# Patient Record
Sex: FEMALE | Employment: UNEMPLOYED | URBAN - METROPOLITAN AREA
[De-identification: names, ages, dates, MRNs, and addresses within clinical notes are randomized per-mention and may not be internally consistent; named-entity substitution may affect disease eponyms.]

---
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
## 2018-02-28 LAB
SURGICAL PATHOLOGY REPORT: NORMAL
SURGICAL PATHOLOGY REPORT: NORMAL

## 2020-12-05 ENCOUNTER — HOSPITAL ENCOUNTER (EMERGENCY)
Age: 20
Discharge: HOME | End: 2020-12-05
Attending: EMERGENCY MEDICINE
Payer: COMMERCIAL

## 2020-12-05 VITALS
OXYGEN SATURATION: 96 % | HEART RATE: 96 BPM | SYSTOLIC BLOOD PRESSURE: 103 MMHG | DIASTOLIC BLOOD PRESSURE: 74 MMHG | RESPIRATION RATE: 18 BRPM | TEMPERATURE: 97.5 F

## 2020-12-05 PROCEDURE — 99284 EMERGENCY DEPT VISIT MOD MDM: CPT

## 2020-12-05 PROCEDURE — 2580000003 HC RX 258: Performed by: EMERGENCY MEDICINE

## 2020-12-05 PROCEDURE — 6360000002 HC RX W HCPCS: Performed by: EMERGENCY MEDICINE

## 2020-12-05 PROCEDURE — 96374 THER/PROPH/DIAG INJ IV PUSH: CPT

## 2020-12-05 RX ORDER — ONDANSETRON 2 MG/ML
4 INJECTION INTRAMUSCULAR; INTRAVENOUS EVERY 6 HOURS PRN
Status: DISCONTINUED | OUTPATIENT
Start: 2020-12-05 | End: 2020-12-05 | Stop reason: HOSPADM

## 2020-12-05 RX ORDER — SODIUM CHLORIDE, SODIUM LACTATE, POTASSIUM CHLORIDE, CALCIUM CHLORIDE 600; 310; 30; 20 MG/100ML; MG/100ML; MG/100ML; MG/100ML
1000 INJECTION, SOLUTION INTRAVENOUS ONCE
Status: COMPLETED | OUTPATIENT
Start: 2020-12-05 | End: 2020-12-05

## 2020-12-05 RX ADMIN — SODIUM CHLORIDE, POTASSIUM CHLORIDE, SODIUM LACTATE AND CALCIUM CHLORIDE 1000 ML: 600; 310; 30; 20 INJECTION, SOLUTION INTRAVENOUS at 00:22

## 2020-12-05 RX ADMIN — ONDANSETRON 4 MG: 2 INJECTION INTRAMUSCULAR; INTRAVENOUS at 00:18

## 2020-12-05 ASSESSMENT — ENCOUNTER SYMPTOMS
DIARRHEA: 0
SHORTNESS OF BREATH: 0
ABDOMINAL PAIN: 0
WHEEZING: 0
EYE PAIN: 0
VOMITING: 1
NAUSEA: 1
COUGH: 0

## 2020-12-05 NOTE — ED NOTES
Pt resting in bed. Pt states that she does not feel nauseas and does not have any needs at this time.       Jairo Aldana RN  12/05/20 3232

## 2020-12-05 NOTE — ED PROVIDER NOTES
[address] ENCOUNTER          ATTENDING PHYSICIAN NOTE       Date of evaluation: 12/5/2020    Chief Complaint     Alcohol Intoxication (pt states she has been drinking since 6pm )      History of Present Illness     Lenore Cristina is a 23 y.o. female who presents with a chief complaint of alcohol intoxication. She has brought here by her friends. She was having a girls night and they have been drinking since approximately 6 PM.  No reported drug use. She states to me she believes she drank too much but cannot provide any further history. Denies any pain. Has vomited once prior to arrival.  No abdominal pain. No falls. Review of Systems     Review of Systems   Constitutional: Negative for chills and fever. HENT: Negative for congestion. Eyes: Negative for pain. Respiratory: Negative for cough, shortness of breath and wheezing. Cardiovascular: Negative for chest pain and leg swelling. Gastrointestinal: Positive for nausea and vomiting. Negative for abdominal pain and diarrhea. Genitourinary: Negative for dysuria. Musculoskeletal: Negative for arthralgias. Skin: Negative for rash. Neurological: Negative for weakness and headaches. All other systems reviewed and are negative. Past Medical, Surgical, Family, and Social History     History reviewed. No pertinent past medical history. History reviewed. No pertinent surgical history. Her family history is not on file. She reports that she has never smoked. She has never used smokeless tobacco. She reports current alcohol use. She reports that she does not use drugs. Medications     Previous Medications    No medications on file       Allergies     She has No Known Allergies.     Physical Exam     ED Triage Vitals [12/05/20 0011]   Enc Vitals Group      BP (!) 127/93      Pulse 102      Resp 25      Temp 97.5 °F (36.4 °C)      Temp Source Oral      SpO2 93 %      Weight       Height       Head Circumference       Peak Flow       Pain Score       Pain Loc       Pain Edu? Excl. in [address]? General:  Non-toxic, no acute distress, fully cooperative with my exam    HEENT:  NCAT, PERRL    Neck:  Supple    Pulmonary:   No increased work of breathing; CTAB    Cardiac:  RRR, no murmur, thrill or gallop. Capillary refill <3s. 2+ distal pulses    Abdomen:  Soft, nontender, nondistended; no focal rebound or guarding    Musculoskeletal:  Grossly intact without obvious injury or deformity    Neuro:  Intoxicated but awakens, no focal motor deficits    Skin: No rashes      Diagnostic Results     RADIOLOGY:  No orders to display       LABS:   No results found for this visit on 12/05/20. RECENT VITALS:  BP: 103/74, Temp: 97.5 °F (36.4 °C), Pulse: 96, Resp: 18, SpO2: 96 %     Procedures         ED Course     Nursing Notes, Past Medical Hx, Past Surgical Hx, Social Hx, Allergies, and Family Hx were reviewed. The patient was given the following medications:  Orders Placed This Encounter   Medications    lactated ringers infusion 1,000 mL    ondansetron (ZOFRAN) injection 4 mg       CONSULTS:  None    MEDICAL DECISION MAKING / ASSESSMENT / Nina Jc is a 23 y.o. female who presents with acute alcohol intoxication. No signs of trauma or other toxidrome. Vomiting improved with zofran. Given 1L IVF. Observed for several hours until clinical sobriety and discharged home. Clinical Impression     1.  [facility] without complication [facility]        Disposition     PATIENT REFERRED TO:  Annalee Parada APRN - CNP  [address] [address]            DISCHARGE MEDICATIONS:  New Prescriptions    No medications on file       [address] [facility] MD Sand[place]  12/05/20 6177

## 2020-12-05 NOTE — ED TRIAGE NOTES
Pt was brought to ED by friends for alcohol intoxication. Pt states she has been drinking since 6pm today and cannot remember what time her last drink was. Pt states she does not drink every, just occasionally with friends. Pt states she thinks she was drinking wine and beer tonight.

## 2020-12-05 NOTE — ED NOTES
Discharge instructions reviewed with pt and IV removed with no complications. Pt is calling her friend for a ride home.      Patricia Arango RN  12/05/20 2687

## 2020-12-05 NOTE — ED NOTES
Pt is resting in bed and states she has no complaints at this time. Call light within reach. Will continue to monitor.       Darnell Lanes, RN  12/05/20 0127